# Patient Record
Sex: FEMALE | Race: BLACK OR AFRICAN AMERICAN | NOT HISPANIC OR LATINO
[De-identification: names, ages, dates, MRNs, and addresses within clinical notes are randomized per-mention and may not be internally consistent; named-entity substitution may affect disease eponyms.]

---

## 2019-06-10 ENCOUNTER — APPOINTMENT (OUTPATIENT)
Dept: PULMONOLOGY | Facility: CLINIC | Age: 64
End: 2019-06-10
Payer: COMMERCIAL

## 2019-06-10 VITALS
DIASTOLIC BLOOD PRESSURE: 82 MMHG | OXYGEN SATURATION: 94 % | HEIGHT: 66 IN | HEART RATE: 90 BPM | SYSTOLIC BLOOD PRESSURE: 136 MMHG | BODY MASS INDEX: 28.77 KG/M2 | WEIGHT: 179 LBS | RESPIRATION RATE: 16 BRPM

## 2019-06-10 DIAGNOSIS — J45.909 UNSPECIFIED ASTHMA, UNCOMPLICATED: ICD-10-CM

## 2019-06-10 PROCEDURE — 99204 OFFICE O/P NEW MOD 45 MIN: CPT | Mod: 25

## 2019-06-10 PROCEDURE — 94729 DIFFUSING CAPACITY: CPT

## 2019-06-10 PROCEDURE — 94060 EVALUATION OF WHEEZING: CPT

## 2019-06-10 PROCEDURE — 94727 GAS DIL/WSHOT DETER LNG VOL: CPT

## 2019-06-10 PROCEDURE — ZZZZZ: CPT

## 2019-06-10 RX ORDER — PREDNISONE 20 MG/1
20 TABLET ORAL DAILY
Qty: 14 | Refills: 0 | Status: ACTIVE | COMMUNITY
Start: 2019-06-10 | End: 1900-01-01

## 2019-06-10 RX ORDER — DOXYCYCLINE 100 MG/1
100 CAPSULE ORAL
Qty: 14 | Refills: 0 | Status: ACTIVE | COMMUNITY
Start: 2019-06-10 | End: 1900-01-01

## 2019-06-10 NOTE — HISTORY OF PRESENT ILLNESS
[FreeTextEntry1] : Patient is a 64 yea old female Hx HTN presents to [facility] for evaluation shortness of breath recent treatment for bronchitis.  Patient had been in her usual state of health until January when  Patient reports she was treated for bronchitis x 3 with antibiotics, steroid and albuterol rescue.  She reports as soon as she completes treatment symptoms return.  She is here for evaluation recurrent bronchitis.

## 2019-06-10 NOTE — REVIEW OF SYSTEMS
[Cough] : cough [Frequent URIs] : frequent upper respiratory infections [Dyspnea] : dyspnea [Wheezing] : wheezing [Hypertension] : ~T hypertension [Negative] : Sleep Disorder

## 2019-06-10 NOTE — ASSESSMENT
[FreeTextEntry1] : 64 year old female Hx HTN presents for evaluation recurrent bronchitis, asthma exacerbation\par \par Initiate Doxycycline 100 mg twice daily\par Continue Breo-Ellipta 100-25 mcg daily\par Prednisone 20 mg 2 tabs daily x 4 days\par Nebulized Albuterol/Ipratropium every 4 hours as needed\par \par Patient lives in [place].  Follow up when in [place].  Patient advised to establish care in [place] with Pulmonologist\par

## 2019-06-10 NOTE — PHYSICAL EXAM
[General Appearance - Well Nourished] : well nourished [General Appearance - Well Developed] : well developed [General Appearance - In No Acute Distress] : no acute distress [Normal Conjunctiva] : the conjunctiva exhibited no abnormalities [Jugular Venous Distention Increased] : there was no jugular-venous distention [Heart Sounds] : normal S1 and S2 [Respiration, Rhythm And Depth] : normal respiratory rhythm and effort [Abdomen Soft] : soft [Nail Clubbing] : no clubbing of the fingernails [Abnormal Walk] : normal gait [Cyanosis, Localized] : no localized cyanosis [Skin Color & Pigmentation] : normal skin color and pigmentation [Non-Pitting] : non-pitting [] : no rash [Cranial Nerves] : cranial nerves 2-12 were intact [No Focal Deficits] : no focal deficits [Oriented To Time, Place, And Person] : oriented to person, place, and time [Erythema] : no erythema of the pharynx [FreeTextEntry1] : Bilateral expiratory wheeze

## 2019-06-10 NOTE — PROCEDURE
[FreeTextEntry1] : PFT 6/10/2019 personally reviewed mild obstructive ventilatory defect without gas exchange abnormality and mild bronchodilator response

## 2020-07-09 ENCOUNTER — APPOINTMENT (OUTPATIENT)
Dept: PULMONOLOGY | Facility: CLINIC | Age: 65
End: 2020-07-09
Payer: MEDICARE

## 2020-07-09 VITALS
DIASTOLIC BLOOD PRESSURE: 80 MMHG | HEART RATE: 80 BPM | WEIGHT: 186 LBS | RESPIRATION RATE: 16 BRPM | SYSTOLIC BLOOD PRESSURE: 130 MMHG | OXYGEN SATURATION: 98 % | TEMPERATURE: 97.9 F | BODY MASS INDEX: 29.89 KG/M2 | HEIGHT: 66 IN

## 2020-07-09 PROCEDURE — 99214 OFFICE O/P EST MOD 30 MIN: CPT

## 2020-07-09 RX ORDER — FLUTICASONE FUROATE AND VILANTEROL TRIFENATATE 100; 25 UG/1; UG/1
100-25 POWDER RESPIRATORY (INHALATION) DAILY
Qty: 1 | Refills: 3 | Status: DISCONTINUED | COMMUNITY
Start: 2019-06-10 | End: 2020-07-09

## 2020-07-09 RX ORDER — FLUTICASONE PROPIONATE AND SALMETEROL 250; 50 UG/1; UG/1
250-50 POWDER RESPIRATORY (INHALATION)
Qty: 1 | Refills: 3 | Status: ACTIVE | COMMUNITY
Start: 2020-07-09 | End: 1900-01-01

## 2020-07-09 NOTE — REVIEW OF SYSTEMS
[Cough] : cough [Dyspnea] : dyspnea [Frequent URIs] : frequent upper respiratory infections [Wheezing] : wheezing [Hypertension] : ~T hypertension [Negative] : Sleep Disorder

## 2020-07-21 RX ORDER — IPRATROPIUM BROMIDE AND ALBUTEROL SULFATE 2.5; .5 MG/3ML; MG/3ML
0.5-2.5 (3) SOLUTION RESPIRATORY (INHALATION)
Qty: 1 | Refills: 3 | Status: ACTIVE | COMMUNITY
Start: 2019-06-10 | End: 1900-01-01

## 2020-07-21 NOTE — HISTORY OF PRESENT ILLNESS
[Never] : never [TextBox_4] : Patient is a 64 yea old female Hx HTN presents to [facility] for follow up asthma.  Patient now living in [place].  She hads been doing well on Advair 250/50 twice daily.  She does use nebulized medications at times.  She has been overall well and without new respiratory complaints.

## 2020-07-21 NOTE — PHYSICAL EXAM
[General Appearance - Well Developed] : well developed [General Appearance - Well Nourished] : well nourished [General Appearance - In No Acute Distress] : no acute distress [Normal Conjunctiva] : the conjunctiva exhibited no abnormalities [Jugular Venous Distention Increased] : there was no jugular-venous distention [Heart Sounds] : normal S1 and S2 [Respiration, Rhythm And Depth] : normal respiratory rhythm and effort [Abnormal Walk] : normal gait [Abdomen Soft] : soft [Cyanosis, Localized] : no localized cyanosis [Nail Clubbing] : no clubbing of the fingernails [Non-Pitting] : non-pitting [Skin Color & Pigmentation] : normal skin color and pigmentation [] : no rash [Cranial Nerves] : cranial nerves 2-12 were intact [No Focal Deficits] : no focal deficits [Oriented To Time, Place, And Person] : oriented to person, place, and time [FreeTextEntry1] : Bilateral expiratory wheeze [Erythema] : no erythema of the pharynx

## 2020-08-05 NOTE — ASSESSMENT
[FreeTextEntry1] : 64 year old female Hx HTN presents for evaluation recurrent bronchitis, asthma exacerbation\par \par Continue Advair 250/50 1 puff BID\par Nebulized Albuterol/Ipratropium every 4-6 hours only if needed\par \par Follow up 4-6 months with PFT\par  Negative

## 2020-12-17 ENCOUNTER — APPOINTMENT (OUTPATIENT)
Age: 65
End: 2020-12-17

## 2021-06-14 ENCOUNTER — APPOINTMENT (OUTPATIENT)
Dept: PULMONOLOGY | Facility: CLINIC | Age: 66
End: 2021-06-14

## 2021-06-22 ENCOUNTER — APPOINTMENT (OUTPATIENT)
Dept: PULMONOLOGY | Facility: CLINIC | Age: 66
End: 2021-06-22

## 2021-07-27 ENCOUNTER — APPOINTMENT (OUTPATIENT)
Dept: PULMONOLOGY | Facility: CLINIC | Age: 66
End: 2021-07-27
Payer: MEDICARE

## 2021-07-27 ENCOUNTER — TRANSCRIPTION ENCOUNTER (OUTPATIENT)
Age: 66
End: 2021-07-27

## 2021-07-27 VITALS
WEIGHT: 172 LBS | HEART RATE: 73 BPM | BODY MASS INDEX: 29.37 KG/M2 | TEMPERATURE: 97.3 F | OXYGEN SATURATION: 98 % | HEIGHT: 64 IN | DIASTOLIC BLOOD PRESSURE: 80 MMHG | SYSTOLIC BLOOD PRESSURE: 130 MMHG | RESPIRATION RATE: 16 BRPM

## 2021-07-27 DIAGNOSIS — Z98.890 OTHER SPECIFIED POSTPROCEDURAL STATES: ICD-10-CM

## 2021-07-27 DIAGNOSIS — J45.901 UNSPECIFIED ASTHMA WITH (ACUTE) EXACERBATION: ICD-10-CM

## 2021-07-27 PROCEDURE — 94727 GAS DIL/WSHOT DETER LNG VOL: CPT

## 2021-07-27 PROCEDURE — 94010 BREATHING CAPACITY TEST: CPT

## 2021-07-27 PROCEDURE — 99214 OFFICE O/P EST MOD 30 MIN: CPT | Mod: 25

## 2021-07-27 PROCEDURE — 94729 DIFFUSING CAPACITY: CPT

## 2021-07-27 PROCEDURE — ZZZZZ: CPT

## 2021-07-27 RX ORDER — PREDNISONE 10 MG/1
10 TABLET ORAL DAILY
Qty: 14 | Refills: 1 | Status: ACTIVE | COMMUNITY
Start: 2021-07-27 | End: 1900-01-01

## 2021-07-27 RX ORDER — FLUTICASONE PROPIONATE AND SALMETEROL 250; 50 UG/1; UG/1
250-50 POWDER RESPIRATORY (INHALATION)
Qty: 1 | Refills: 0 | Status: DISCONTINUED | COMMUNITY
Start: 2019-10-21 | End: 2021-07-27

## 2021-07-27 RX ORDER — FLUTICASONE FUROATE, UMECLIDINIUM BROMIDE AND VILANTEROL TRIFENATATE 100; 62.5; 25 UG/1; UG/1; UG/1
100-62.5-25 POWDER RESPIRATORY (INHALATION) DAILY
Qty: 1 | Refills: 3 | Status: ACTIVE | COMMUNITY
Start: 2021-07-27 | End: 1900-01-01

## 2021-07-27 NOTE — PROCEDURE
[FreeTextEntry1] : PFT 6/10/2019 personally reviewed mild obstructive ventilatory defect without gas exchange abnormality and mild bronchodilator response\par \par PFT 7/27/21 personally reviewed minimal obstructive ventilatory defect without gas exchange abnormality

## 2021-07-27 NOTE — HISTORY OF PRESENT ILLNESS
[Never] : never [TextBox_4] : Patient is a 64 yea old female Hx HTN presents to [facility] for follow up asthma.  Patient lives in [place].  She reports she has been very wheezy lately and Advair is not working for her.  She reportsshe is using nebulizer frequently.  She is here for follow up

## 2021-07-27 NOTE — ASSESSMENT
[FreeTextEntry1] : 64 year old female Hx HTN presents for evaluation recurrent bronchitis, asthma exacerbation\par \par Discontinue  Advair 250/50 1 puff BID\par Short course of prednisone 20 mg daily x 7 days\par Initiate trial of Trelegy Ellipta 100-25 mcg daily\par Nebulized Albuterol/Ipratropium every 4-6 hours only if needed\par \par Follow up 6 months \par \par After completion of above plan of care patient pulmonary status optimized for planned colonoscopy.  patietn to continue Trelegy daily as directed.\par \par

## 2021-07-27 NOTE — PHYSICAL EXAM
[General Appearance - Well Developed] : well developed [General Appearance - Well Nourished] : well nourished [General Appearance - In No Acute Distress] : no acute distress [Normal Conjunctiva] : the conjunctiva exhibited no abnormalities [Jugular Venous Distention Increased] : there was no jugular-venous distention [Heart Sounds] : normal S1 and S2 [Respiration, Rhythm And Depth] : normal respiratory rhythm and effort [Abdomen Soft] : soft [Abnormal Walk] : normal gait [Nail Clubbing] : no clubbing of the fingernails [Cyanosis, Localized] : no localized cyanosis [Non-Pitting] : non-pitting [Skin Color & Pigmentation] : normal skin color and pigmentation [] : no rash [Cranial Nerves] : cranial nerves 2-12 were intact [No Focal Deficits] : no focal deficits [Oriented To Time, Place, And Person] : oriented to person, place, and time [Erythema] : no erythema of the pharynx [FreeTextEntry1] : Bilateral expiratory wheeze

## 2021-12-27 ENCOUNTER — APPOINTMENT (OUTPATIENT)
Dept: PULMONOLOGY | Facility: CLINIC | Age: 66
End: 2021-12-27

## 2021-12-29 ENCOUNTER — APPOINTMENT (OUTPATIENT)
Dept: PULMONOLOGY | Facility: CLINIC | Age: 66
End: 2021-12-29
Payer: MEDICARE

## 2021-12-29 VITALS
RESPIRATION RATE: 16 BRPM | HEIGHT: 64 IN | BODY MASS INDEX: 29.02 KG/M2 | DIASTOLIC BLOOD PRESSURE: 70 MMHG | TEMPERATURE: 97.3 F | OXYGEN SATURATION: 98 % | HEART RATE: 65 BPM | SYSTOLIC BLOOD PRESSURE: 130 MMHG | WEIGHT: 170 LBS

## 2021-12-29 DIAGNOSIS — Z01.811 ENCOUNTER FOR PREPROCEDURAL RESPIRATORY EXAMINATION: ICD-10-CM

## 2021-12-29 DIAGNOSIS — J45.30 MILD PERSISTENT ASTHMA, UNCOMPLICATED: ICD-10-CM

## 2021-12-29 PROCEDURE — 99214 OFFICE O/P EST MOD 30 MIN: CPT

## 2021-12-29 NOTE — HISTORY OF PRESENT ILLNESS
[Never] : never [TextBox_4] : Patient is a 66 year old female Hx HTN presents to [facility] for follow up asthma, pre procedure pulmonary clearance for planned colonoscopy.  Patient planning on colonoscopy in near future.  Her last colonoscopy was cancelled.  She is anticipating procedure in the near future.  She has experienced no increased symptoms shortness of breath, cough.  She is using Trelegy daily.  She is here for optimization prior nicolas planned colonoscopy.

## 2021-12-29 NOTE — ASSESSMENT
[FreeTextEntry1] : 66 year old female Hx HTN presents for follow up mild persistent asthma, pre procedure pulmonary optimization\par \par \par Continue Trelegy Ellipta 100-25 mcg daily\par Nebulized Albuterol/Ipratropium every 4-6 hours only if needed\par \par Follow up 6 months \par \par PATIENT PULMONARY STATUS OPTMIZED FOR PLANNED COLONOSCOPY.\par \par